# Patient Record
Sex: MALE | Race: BLACK OR AFRICAN AMERICAN | NOT HISPANIC OR LATINO | Employment: OTHER | URBAN - METROPOLITAN AREA
[De-identification: names, ages, dates, MRNs, and addresses within clinical notes are randomized per-mention and may not be internally consistent; named-entity substitution may affect disease eponyms.]

---

## 2024-05-04 ENCOUNTER — APPOINTMENT (EMERGENCY)
Dept: CT IMAGING | Facility: HOSPITAL | Age: 44
End: 2024-05-04
Payer: COMMERCIAL

## 2024-05-04 ENCOUNTER — HOSPITAL ENCOUNTER (EMERGENCY)
Facility: HOSPITAL | Age: 44
End: 2024-05-04
Attending: EMERGENCY MEDICINE
Payer: COMMERCIAL

## 2024-05-04 ENCOUNTER — APPOINTMENT (EMERGENCY)
Dept: RADIOLOGY | Facility: HOSPITAL | Age: 44
End: 2024-05-04
Payer: COMMERCIAL

## 2024-05-04 ENCOUNTER — HOSPITAL ENCOUNTER (OUTPATIENT)
Facility: HOSPITAL | Age: 44
Setting detail: OBSERVATION
Discharge: HOME | End: 2024-05-05
Attending: SURGERY | Admitting: SURGERY
Payer: COMMERCIAL

## 2024-05-04 ENCOUNTER — APPOINTMENT (OUTPATIENT)
Dept: RADIOLOGY | Facility: HOSPITAL | Age: 44
End: 2024-05-04
Payer: COMMERCIAL

## 2024-05-04 VITALS
DIASTOLIC BLOOD PRESSURE: 67 MMHG | HEART RATE: 60 BPM | TEMPERATURE: 97.8 F | RESPIRATION RATE: 22 BRPM | SYSTOLIC BLOOD PRESSURE: 125 MMHG | HEIGHT: 67 IN | OXYGEN SATURATION: 100 %

## 2024-05-04 DIAGNOSIS — B35.3 TINEA PEDIS OF BOTH FEET: ICD-10-CM

## 2024-05-04 DIAGNOSIS — S80.11XA HEMATOMA OF LEG, RIGHT, INITIAL ENCOUNTER: Primary | ICD-10-CM

## 2024-05-04 DIAGNOSIS — S81.811A LACERATION OF RIGHT LOWER EXTREMITY, INITIAL ENCOUNTER: Primary | ICD-10-CM

## 2024-05-04 DIAGNOSIS — T14.8XXA PENETRATING TRAUMA: ICD-10-CM

## 2024-05-04 LAB
ABO GROUP BLD: NORMAL
ABO GROUP BLD: NORMAL
ALBUMIN SERPL BCP-MCNC: 4.2 G/DL (ref 3.5–5)
ALBUMIN SERPL BCP-MCNC: 4.4 G/DL (ref 3.5–5)
ALP SERPL-CCNC: 74 U/L (ref 34–104)
ALP SERPL-CCNC: 74 U/L (ref 34–104)
ALT SERPL W P-5'-P-CCNC: 10 U/L (ref 7–52)
ALT SERPL W P-5'-P-CCNC: 11 U/L (ref 7–52)
ANION GAP SERPL CALCULATED.3IONS-SCNC: 4 MMOL/L (ref 4–13)
ANION GAP SERPL CALCULATED.3IONS-SCNC: 9 MMOL/L (ref 4–13)
APTT PPP: 29 SECONDS (ref 23–37)
AST SERPL W P-5'-P-CCNC: 15 U/L (ref 13–39)
AST SERPL W P-5'-P-CCNC: 17 U/L (ref 13–39)
ATRIAL RATE: 58 BPM
BASOPHILS # BLD AUTO: 0.02 THOUSANDS/ÂΜL (ref 0–0.1)
BASOPHILS # BLD AUTO: 0.03 THOUSANDS/ÂΜL (ref 0–0.1)
BASOPHILS NFR BLD AUTO: 0 % (ref 0–1)
BASOPHILS NFR BLD AUTO: 1 % (ref 0–1)
BILIRUB SERPL-MCNC: 0.74 MG/DL (ref 0.2–1)
BILIRUB SERPL-MCNC: 0.77 MG/DL (ref 0.2–1)
BLD GP AB SCN SERPL QL: NEGATIVE
BLD GP AB SCN SERPL QL: NEGATIVE
BUN SERPL-MCNC: 12 MG/DL (ref 5–25)
BUN SERPL-MCNC: 15 MG/DL (ref 5–25)
CALCIUM SERPL-MCNC: 8.7 MG/DL (ref 8.4–10.2)
CALCIUM SERPL-MCNC: 8.9 MG/DL (ref 8.4–10.2)
CHLORIDE SERPL-SCNC: 103 MMOL/L (ref 96–108)
CHLORIDE SERPL-SCNC: 107 MMOL/L (ref 96–108)
CO2 SERPL-SCNC: 24 MMOL/L (ref 21–32)
CO2 SERPL-SCNC: 29 MMOL/L (ref 21–32)
CREAT SERPL-MCNC: 0.92 MG/DL (ref 0.6–1.3)
CREAT SERPL-MCNC: 0.98 MG/DL (ref 0.6–1.3)
EOSINOPHIL # BLD AUTO: 0.04 THOUSAND/ÂΜL (ref 0–0.61)
EOSINOPHIL # BLD AUTO: 0.08 THOUSAND/ÂΜL (ref 0–0.61)
EOSINOPHIL NFR BLD AUTO: 0 % (ref 0–6)
EOSINOPHIL NFR BLD AUTO: 1 % (ref 0–6)
ERYTHROCYTE [DISTWIDTH] IN BLOOD BY AUTOMATED COUNT: 13.2 % (ref 11.6–15.1)
ERYTHROCYTE [DISTWIDTH] IN BLOOD BY AUTOMATED COUNT: 13.2 % (ref 11.6–15.1)
GFR SERPL CREATININE-BSD FRML MDRD: 92 ML/MIN/1.73SQ M
GFR SERPL CREATININE-BSD FRML MDRD: 99 ML/MIN/1.73SQ M
GLUCOSE SERPL-MCNC: 72 MG/DL (ref 65–140)
GLUCOSE SERPL-MCNC: 93 MG/DL (ref 65–140)
HCT VFR BLD AUTO: 43.3 % (ref 36.5–49.3)
HCT VFR BLD AUTO: 46 % (ref 36.5–49.3)
HGB BLD-MCNC: 14 G/DL (ref 12–17)
HGB BLD-MCNC: 14.7 G/DL (ref 12–17)
IMM GRANULOCYTES # BLD AUTO: 0.01 THOUSAND/UL (ref 0–0.2)
IMM GRANULOCYTES # BLD AUTO: 0.03 THOUSAND/UL (ref 0–0.2)
IMM GRANULOCYTES NFR BLD AUTO: 0 % (ref 0–2)
IMM GRANULOCYTES NFR BLD AUTO: 0 % (ref 0–2)
INR PPP: 1.02 (ref 0.84–1.19)
LYMPHOCYTES # BLD AUTO: 2.03 THOUSANDS/ÂΜL (ref 0.6–4.47)
LYMPHOCYTES # BLD AUTO: 2.09 THOUSANDS/ÂΜL (ref 0.6–4.47)
LYMPHOCYTES NFR BLD AUTO: 20 % (ref 14–44)
LYMPHOCYTES NFR BLD AUTO: 35 % (ref 14–44)
MCH RBC QN AUTO: 30.6 PG (ref 26.8–34.3)
MCH RBC QN AUTO: 30.8 PG (ref 26.8–34.3)
MCHC RBC AUTO-ENTMCNC: 32 G/DL (ref 31.4–37.4)
MCHC RBC AUTO-ENTMCNC: 32.3 G/DL (ref 31.4–37.4)
MCV RBC AUTO: 95 FL (ref 82–98)
MCV RBC AUTO: 96 FL (ref 82–98)
MONOCYTES # BLD AUTO: 0.58 THOUSAND/ÂΜL (ref 0.17–1.22)
MONOCYTES # BLD AUTO: 0.79 THOUSAND/ÂΜL (ref 0.17–1.22)
MONOCYTES NFR BLD AUTO: 10 % (ref 4–12)
MONOCYTES NFR BLD AUTO: 8 % (ref 4–12)
NEUTROPHILS # BLD AUTO: 3.12 THOUSANDS/ÂΜL (ref 1.85–7.62)
NEUTROPHILS # BLD AUTO: 7.15 THOUSANDS/ÂΜL (ref 1.85–7.62)
NEUTS SEG NFR BLD AUTO: 53 % (ref 43–75)
NEUTS SEG NFR BLD AUTO: 72 % (ref 43–75)
NRBC BLD AUTO-RTO: 0 /100 WBCS
NRBC BLD AUTO-RTO: 0 /100 WBCS
P AXIS: 69 DEGREES
PLATELET # BLD AUTO: 204 THOUSANDS/UL (ref 149–390)
PLATELET # BLD AUTO: 208 THOUSANDS/UL (ref 149–390)
PMV BLD AUTO: 10 FL (ref 8.9–12.7)
PMV BLD AUTO: 9.5 FL (ref 8.9–12.7)
POTASSIUM SERPL-SCNC: 3.7 MMOL/L (ref 3.5–5.3)
POTASSIUM SERPL-SCNC: 3.9 MMOL/L (ref 3.5–5.3)
PR INTERVAL: 130 MS
PROT SERPL-MCNC: 6.6 G/DL (ref 6.4–8.4)
PROT SERPL-MCNC: 6.8 G/DL (ref 6.4–8.4)
PROTHROMBIN TIME: 14 SECONDS (ref 11.6–14.5)
QRS AXIS: 53 DEGREES
QRSD INTERVAL: 72 MS
QT INTERVAL: 418 MS
QTC INTERVAL: 410 MS
RBC # BLD AUTO: 4.58 MILLION/UL (ref 3.88–5.62)
RBC # BLD AUTO: 4.77 MILLION/UL (ref 3.88–5.62)
RH BLD: POSITIVE
RH BLD: POSITIVE
SODIUM SERPL-SCNC: 136 MMOL/L (ref 135–147)
SODIUM SERPL-SCNC: 140 MMOL/L (ref 135–147)
SPECIMEN EXPIRATION DATE: NORMAL
SPECIMEN EXPIRATION DATE: NORMAL
T WAVE AXIS: 72 DEGREES
VENTRICULAR RATE: 58 BPM
WBC # BLD AUTO: 10.06 THOUSAND/UL (ref 4.31–10.16)
WBC # BLD AUTO: 5.91 THOUSAND/UL (ref 4.31–10.16)

## 2024-05-04 PROCEDURE — 96365 THER/PROPH/DIAG IV INF INIT: CPT

## 2024-05-04 PROCEDURE — EDAIR PR ED AIR: Performed by: EMERGENCY MEDICINE

## 2024-05-04 PROCEDURE — 82330 ASSAY OF CALCIUM: CPT

## 2024-05-04 PROCEDURE — 84132 ASSAY OF SERUM POTASSIUM: CPT

## 2024-05-04 PROCEDURE — 82803 BLOOD GASES ANY COMBINATION: CPT

## 2024-05-04 PROCEDURE — 85025 COMPLETE CBC W/AUTO DIFF WBC: CPT | Performed by: SURGERY

## 2024-05-04 PROCEDURE — 99223 1ST HOSP IP/OBS HIGH 75: CPT | Performed by: SURGERY

## 2024-05-04 PROCEDURE — 71045 X-RAY EXAM CHEST 1 VIEW: CPT

## 2024-05-04 PROCEDURE — 93010 ELECTROCARDIOGRAM REPORT: CPT | Performed by: INTERNAL MEDICINE

## 2024-05-04 PROCEDURE — 86901 BLOOD TYPING SEROLOGIC RH(D): CPT | Performed by: SURGERY

## 2024-05-04 PROCEDURE — 73706 CT ANGIO LWR EXTR W/O&W/DYE: CPT

## 2024-05-04 PROCEDURE — 96375 TX/PRO/DX INJ NEW DRUG ADDON: CPT

## 2024-05-04 PROCEDURE — 73551 X-RAY EXAM OF FEMUR 1: CPT

## 2024-05-04 PROCEDURE — 85014 HEMATOCRIT: CPT

## 2024-05-04 PROCEDURE — 99284 EMERGENCY DEPT VISIT MOD MDM: CPT

## 2024-05-04 PROCEDURE — 85610 PROTHROMBIN TIME: CPT | Performed by: EMERGENCY MEDICINE

## 2024-05-04 PROCEDURE — 86901 BLOOD TYPING SEROLOGIC RH(D): CPT | Performed by: EMERGENCY MEDICINE

## 2024-05-04 PROCEDURE — 86850 RBC ANTIBODY SCREEN: CPT | Performed by: EMERGENCY MEDICINE

## 2024-05-04 PROCEDURE — 85025 COMPLETE CBC W/AUTO DIFF WBC: CPT | Performed by: EMERGENCY MEDICINE

## 2024-05-04 PROCEDURE — 93005 ELECTROCARDIOGRAM TRACING: CPT

## 2024-05-04 PROCEDURE — 85730 THROMBOPLASTIN TIME PARTIAL: CPT | Performed by: EMERGENCY MEDICINE

## 2024-05-04 PROCEDURE — 90715 TDAP VACCINE 7 YRS/> IM: CPT | Performed by: EMERGENCY MEDICINE

## 2024-05-04 PROCEDURE — 96376 TX/PRO/DX INJ SAME DRUG ADON: CPT

## 2024-05-04 PROCEDURE — 86900 BLOOD TYPING SEROLOGIC ABO: CPT | Performed by: SURGERY

## 2024-05-04 PROCEDURE — 86850 RBC ANTIBODY SCREEN: CPT | Performed by: SURGERY

## 2024-05-04 PROCEDURE — 86900 BLOOD TYPING SEROLOGIC ABO: CPT | Performed by: EMERGENCY MEDICINE

## 2024-05-04 PROCEDURE — 80053 COMPREHEN METABOLIC PANEL: CPT | Performed by: SURGERY

## 2024-05-04 PROCEDURE — 82947 ASSAY GLUCOSE BLOOD QUANT: CPT

## 2024-05-04 PROCEDURE — 36415 COLL VENOUS BLD VENIPUNCTURE: CPT | Performed by: EMERGENCY MEDICINE

## 2024-05-04 PROCEDURE — 84295 ASSAY OF SERUM SODIUM: CPT

## 2024-05-04 PROCEDURE — 80053 COMPREHEN METABOLIC PANEL: CPT | Performed by: EMERGENCY MEDICINE

## 2024-05-04 RX ORDER — CLINDAMYCIN HYDROCHLORIDE 150 MG/1
450 CAPSULE ORAL EVERY 6 HOURS
Qty: 120 CAPSULE | Refills: 0 | Status: SHIPPED | OUTPATIENT
Start: 2024-05-04 | End: 2024-05-05

## 2024-05-04 RX ORDER — LIDOCAINE HCL/EPINEPHRINE/PF 2%-1:200K
1 VIAL (ML) INJECTION ONCE
Status: COMPLETED | OUTPATIENT
Start: 2024-05-04 | End: 2024-05-04

## 2024-05-04 RX ORDER — ENOXAPARIN SODIUM 100 MG/ML
30 INJECTION SUBCUTANEOUS EVERY 12 HOURS
Status: DISCONTINUED | OUTPATIENT
Start: 2024-05-05 | End: 2024-05-05 | Stop reason: HOSPADM

## 2024-05-04 RX ORDER — ACETAMINOPHEN 325 MG/1
975 TABLET ORAL EVERY 6 HOURS SCHEDULED
Status: DISCONTINUED | OUTPATIENT
Start: 2024-05-05 | End: 2024-05-05 | Stop reason: HOSPADM

## 2024-05-04 RX ORDER — CLINDAMYCIN PHOSPHATE 600 MG/50ML
600 INJECTION, SOLUTION INTRAVENOUS ONCE
Qty: 50 ML | Refills: 0 | Status: COMPLETED | OUTPATIENT
Start: 2024-05-04 | End: 2024-05-04

## 2024-05-04 RX ORDER — FENTANYL CITRATE 50 UG/ML
100 INJECTION, SOLUTION INTRAMUSCULAR; INTRAVENOUS ONCE
Status: DISCONTINUED | OUTPATIENT
Start: 2024-05-04 | End: 2024-05-04 | Stop reason: HOSPADM

## 2024-05-04 RX ORDER — OXYCODONE HYDROCHLORIDE AND ACETAMINOPHEN 5; 325 MG/1; MG/1
1 TABLET ORAL EVERY 4 HOURS PRN
Qty: 15 TABLET | Refills: 0 | Status: SHIPPED | OUTPATIENT
Start: 2024-05-04 | End: 2024-05-05

## 2024-05-04 RX ORDER — MORPHINE SULFATE 10 MG/ML
6 INJECTION, SOLUTION INTRAMUSCULAR; INTRAVENOUS ONCE
Status: COMPLETED | OUTPATIENT
Start: 2024-05-04 | End: 2024-05-04

## 2024-05-04 RX ORDER — LIDOCAINE HCL/EPINEPHRINE/PF 2%-1:200K
VIAL (ML) INJECTION
Status: COMPLETED
Start: 2024-05-04 | End: 2024-05-04

## 2024-05-04 RX ORDER — OXYCODONE HYDROCHLORIDE 5 MG/1
5 TABLET ORAL EVERY 4 HOURS PRN
Status: DISCONTINUED | OUTPATIENT
Start: 2024-05-04 | End: 2024-05-05 | Stop reason: HOSPADM

## 2024-05-04 RX ADMIN — CLINDAMYCIN PHOSPHATE 600 MG: 600 INJECTION, SOLUTION INTRAVENOUS at 19:38

## 2024-05-04 RX ADMIN — MORPHINE SULFATE 6 MG: 10 INJECTION INTRAVENOUS at 17:30

## 2024-05-04 RX ADMIN — Medication 1 ML: at 17:36

## 2024-05-04 RX ADMIN — TETANUS TOXOID, REDUCED DIPHTHERIA TOXOID AND ACELLULAR PERTUSSIS VACCINE, ADSORBED 0.5 ML: 5; 2.5; 8; 8; 2.5 SUSPENSION INTRAMUSCULAR at 18:48

## 2024-05-04 RX ADMIN — IOHEXOL 100 ML: 350 INJECTION, SOLUTION INTRAVENOUS at 18:26

## 2024-05-04 RX ADMIN — MORPHINE SULFATE 6 MG: 10 INJECTION INTRAVENOUS at 21:27

## 2024-05-04 RX ADMIN — LIDOCAINE HYDROCHLORIDE,EPINEPHRINE BITARTRATE 1 ML: 20; .005 INJECTION, SOLUTION EPIDURAL; INFILTRATION; INTRACAUDAL; PERINEURAL at 17:36

## 2024-05-04 RX ADMIN — IOHEXOL 100 ML: 350 INJECTION, SOLUTION INTRAVENOUS at 20:41

## 2024-05-04 NOTE — ED PROVIDER NOTES
History  Chief Complaint   Patient presents with   • Extremity Laceration     Pt states he fell and landed on a sharp object, pt has laceration to right thigh      44-year-old male presents with an injury to right thigh.  Patient states he tripped and fell and presents with a 2 cm laceration with active bleeding, arterial.  Pursestrings are placed and bleeding is able to be controlled with stitches and pressure.  CAT scan is performed to evaluate for active bleeding.  No other injuries occurred.  Neurovascular intact distal to the injury.    Trauma eval    Airway Intact  Breath sounds equal B/L  Circulation patent, 2+ pulses in all extremities  No deformity, no disability, GCS = 15  Exposure completed - no further injury discovered            None       History reviewed. No pertinent past medical history.    History reviewed. No pertinent surgical history.    History reviewed. No pertinent family history.  I have reviewed and agree with the history as documented.    E-Cigarette/Vaping     E-Cigarette/Vaping Substances     Social History     Tobacco Use   • Smoking status: Never   • Smokeless tobacco: Never       Review of Systems   Constitutional:  Negative for chills and fever.   Respiratory:  Negative for chest tightness and shortness of breath.    Cardiovascular:  Negative for chest pain and leg swelling.   Gastrointestinal:  Negative for abdominal pain, nausea and vomiting.   Musculoskeletal:  Negative for back pain and neck pain.        Right leg pain   Skin:  Positive for wound (R leg).   Neurological:  Negative for dizziness, weakness, numbness and headaches.   Hematological:  Does not bruise/bleed easily.       Physical Exam  Physical Exam  Constitutional:       General: He is not in acute distress.     Appearance: He is well-developed. He is not diaphoretic.   HENT:      Head: Normocephalic and atraumatic.      Right Ear: External ear normal.      Left Ear: External ear normal.   Eyes:      General:          Right eye: No discharge.         Left eye: No discharge.      Conjunctiva/sclera: Conjunctivae normal.      Pupils: Pupils are equal, round, and reactive to light.   Neck:      Trachea: No tracheal deviation.      Comments: No midline tenderness, no step offs  Cardiovascular:      Rate and Rhythm: Normal rate and regular rhythm.      Heart sounds: Normal heart sounds.   Pulmonary:      Effort: Pulmonary effort is normal.      Breath sounds: Normal breath sounds. No stridor. No wheezing.   Chest:      Chest wall: No tenderness.   Abdominal:      General: There is no distension.      Palpations: Abdomen is soft.      Tenderness: There is no abdominal tenderness. There is no guarding.      Comments: Stable pelvis   Musculoskeletal:         General: Swelling and tenderness present. No deformity. Normal range of motion.      Cervical back: Neck supple.      Comments: Back is non-tender, no step offs   Skin:     General: Skin is warm and dry.      Comments: No abrasions, no contusions.  2 cm laceration with active bleeding to right thigh.  There is arterial bleeding.  This was controlled with pursestring sutures as well as pressure.  Neurovascular intact distal to the injury.   Neurological:      Mental Status: He is alert and oriented to person, place, and time.      Cranial Nerves: No cranial nerve deficit.      Sensory: No sensory deficit.      Comments: GCS = 15   Psychiatric:         Behavior: Behavior normal.       Vital Signs  ED Triage Vitals   Temperature Pulse Respirations Blood Pressure SpO2   05/04/24 1726 05/04/24 1726 05/04/24 1726 05/04/24 1726 05/04/24 1726   97.8 °F (36.6 °C) 64 16 141/64 99 %      Temp Source Heart Rate Source Patient Position - Orthostatic VS BP Location FiO2 (%)   05/04/24 1726 05/04/24 1726 05/04/24 1726 05/04/24 1726 --   Temporal Monitor Lying Right arm       Pain Score       05/04/24 1730       10 - Worst Possible Pain           Vitals:    05/04/24 1726 05/04/24 1730 05/04/24 1800  05/04/24 1845   BP: 141/64 125/70 135/77 120/55   Pulse: 64 57 (!) 52 (!) 49   Patient Position - Orthostatic VS: Lying Lying Lying Lying         Visual Acuity      ED Medications  Medications   morphine injection 6 mg (6 mg Intravenous Given 5/4/24 1730)   lidocaine-epinephrine (XYLOCAINE-MPF/EPINEPHRINE) 2 %-1:200,000 injection 1 mL (1 mL Infiltration Given 5/4/24 1736)   tetanus-diphtheria-acellular pertussis (BOOSTRIX) IM injection 0.5 mL (0.5 mL Intramuscular Given 5/4/24 1848)   iohexol (OMNIPAQUE) 350 MG/ML injection (MULTI-DOSE) 100 mL (100 mL Intravenous Given 5/4/24 1826)       Diagnostic Studies  Results Reviewed       Procedure Component Value Units Date/Time    Comprehensive metabolic panel [[id number]] Collected: 05/04/24 1734    Lab Status: Final result Specimen: Blood from Arm, Right Updated: 05/04/24 1759     Sodium 140 mmol/L      Potassium 3.9 mmol/L      Chloride 107 mmol/L      CO2 29 mmol/L      ANION GAP 4 mmol/L      BUN 15 mg/dL      Creatinine 0.98 mg/dL      Glucose 72 mg/dL      Calcium 8.9 mg/dL      AST 17 U/L      ALT 11 U/L      Alkaline Phosphatase 74 U/L      Total Protein 6.6 g/dL      Albumin 4.2 g/dL      Total Bilirubin 0.77 mg/dL      eGFR 92 ml/min/1.73sq m     Narrative:      National Kidney Disease Foundation guidelines for Chronic Kidney Disease (CKD):   •  Stage 1 with normal or high GFR (GFR > 90 mL/min/1.73 square meters)  •  Stage 2 Mild CKD (GFR = 60-89 mL/min/1.73 square meters)  •  Stage 3A Moderate CKD (GFR = 45-59 mL/min/1.73 square meters)  •  Stage 3B Moderate CKD (GFR = 30-44 mL/min/1.73 square meters)  •  Stage 4 Severe CKD (GFR = 15-29 mL/min/1.73 square meters)  •  Stage 5 End Stage CKD (GFR <15 mL/min/1.73 square meters)  Note: GFR calculation is accurate only with a steady state creatinine    Protime-INR [[id number]]  (Normal) Collected: 05/04/24 1734    Lab Status: Final result Specimen: Blood from Arm, Right Updated: 05/04/24 3779     Protime 14.0 seconds       INR 1.02    APTT [[id number]]  (Normal) Collected: 05/04/24 1734    Lab Status: Final result Specimen: Blood from Arm, Right Updated: 05/04/24 1752     PTT 29 seconds     CBC and differential [[id number]] Collected: 05/04/24 1734    Lab Status: Final result Specimen: Blood from Arm, Right Updated: 05/04/24 1739     WBC 5.91 Thousand/uL      RBC 4.58 Million/uL      Hemoglobin 14.0 g/dL      Hematocrit 43.3 %      MCV 95 fL      MCH 30.6 pg      MCHC 32.3 g/dL      RDW 13.2 %      MPV 9.5 fL      Platelets 208 Thousands/uL      nRBC 0 /100 WBCs      Segmented % 53 %      Immature Grans % 0 %      Lymphocytes % 35 %      Monocytes % 10 %      Eosinophils Relative 1 %      Basophils Relative 1 %      Absolute Neutrophils 3.12 Thousands/µL      Absolute Immature Grans 0.01 Thousand/uL      Absolute Lymphocytes 2.09 Thousands/µL      Absolute Monocytes 0.58 Thousand/µL      Eosinophils Absolute 0.08 Thousand/µL      Basophils Absolute 0.03 Thousands/µL                    XR chest 1 view portable   ED Interpretation by Gudelia Bello DO (05/04 1821)   No pneumothorax.  No acute sign of traumatic injury.      Final Result by Gorge Dowell DO (05/04 1824)      No acute cardiopulmonary disease.            Workstation performed: [id number]         XR femur 1 vw right   ED Interpretation by Gudelia Bello DO (05/04 1819)   No acute osseous injury or embedded FB      Final Result by Gorge Dowell DO (05/04 1827)      LIMITED STUDY. No acute osseous abnormality within limitations.      Curvilinear density seen along the medial soft tissues mid thigh on only 1 view, etiology unclear.      Repeat 4 view right femoral series may be considered.      Workstation performed: [id number]         CT VENOGRAM LOWER EXTREMITIES RIGHT w/ contrast    (Results Pending)              Procedures  Universal Protocol:  Consent: Verbal consent obtained.  Risks and benefits: risks, benefits and alternatives were  discussed  Consent given by: patient  Patient identity confirmed: verbally with patient  Laceration repair    Date/Time: 5/4/2024 7:21 PM    Performed by: Gudelia Bello DO  Authorized by: Gudelia Bello DO  Foreign bodies: no foreign bodies  Tendon involvement: none  Nerve involvement: none  Vascular damage: no    Sedation:  Patient sedated: no      Wound Dehiscence:  Superficial Wound Dehiscence: simple closure      Procedure Details:  Irrigation solution: saline  Irrigation method: syringe  Amount of cleaning: standard  Debridement: none  Degree of undermining: none  Skin closure: 3-0 nylon (2 pursestring sutures.  3 simple interrupted sutures.)  Approximation: close  Approximation difficulty: simple  Patient tolerance: patient tolerated the procedure well with no immediate complications  Comments: 2 cm laceration to right thigh.  Pressure dressing applied over sutures and bleeding is controlled.             ED Course  ED Course as of 05/04/24 1923   Sat May 04, 2024   1819 Hemoglobin: 14.0   1819 POCT INR: 1.02   1918 Patient believes that he has a Q-tip stuck in his ear.  Both ears evaluated, no q-tips are seen                               SBIRT 22yo+      Flowsheet Row Most Recent Value   Initial Alcohol Screen: US AUDIT-C     1. How often do you have a drink containing alcohol? 0 Filed at: 05/04/2024 1726   2. How many drinks containing alcohol do you have on a typical day you are drinking?  0 Filed at: 05/04/2024 1726   3a. Male UNDER 65: How often do you have five or more drinks on one occasion? 0 Filed at: 05/04/2024 1726   Audit-C Score 0 Filed at: 05/04/2024 1726   LORIE: How many times in the past year have you...    Used an illegal drug or used a prescription medication for non-medical reasons? Never Filed at: 05/04/2024 1726                      Medical Decision Making  Laceration to right thigh.  Sutures are able to stop the bleeding.   CT scan performed to r/o active  extrav.  Tetanus updated.     Amount and/or Complexity of Data Reviewed  Labs:  Decision-making details documented in ED Course.  Radiology: ordered and independent interpretation performed.    Risk  Prescription drug management.           Disposition  Final diagnoses:   Laceration of right lower extremity, initial encounter     Time reflects when diagnosis was documented in both MDM as applicable and the Disposition within this note       Time User Action Codes Description Comment    5/4/2024  7:23 PM Gudelia Bello Add [S81.811A] Laceration of right lower extremity, initial encounter           ED Disposition       None          Follow-up Information    None         Patient's Medications    No medications on file       No discharge procedures on file.    PDMP Review       None            ED Provider  Electronically Signed by

## 2024-05-05 VITALS
OXYGEN SATURATION: 98 % | RESPIRATION RATE: 16 BRPM | HEIGHT: 67 IN | DIASTOLIC BLOOD PRESSURE: 58 MMHG | SYSTOLIC BLOOD PRESSURE: 116 MMHG | BODY MASS INDEX: 24.33 KG/M2 | HEART RATE: 74 BPM | TEMPERATURE: 98.5 F | WEIGHT: 155 LBS

## 2024-05-05 PROBLEM — S80.11XA HEMATOMA OF LEG, RIGHT, INITIAL ENCOUNTER: Status: ACTIVE | Noted: 2024-05-05

## 2024-05-05 PROBLEM — S81.811A LEG LACERATION, RIGHT, INITIAL ENCOUNTER: Status: ACTIVE | Noted: 2024-05-05

## 2024-05-05 LAB
ERYTHROCYTE [DISTWIDTH] IN BLOOD BY AUTOMATED COUNT: 13.2 % (ref 11.6–15.1)
HCT VFR BLD AUTO: 39.3 % (ref 36.5–49.3)
HGB BLD-MCNC: 12.5 G/DL (ref 12–17)
MCH RBC QN AUTO: 30.6 PG (ref 26.8–34.3)
MCHC RBC AUTO-ENTMCNC: 31.8 G/DL (ref 31.4–37.4)
MCV RBC AUTO: 96 FL (ref 82–98)
PLATELET # BLD AUTO: 175 THOUSANDS/UL (ref 149–390)
PMV BLD AUTO: 10 FL (ref 8.9–12.7)
RBC # BLD AUTO: 4.09 MILLION/UL (ref 3.88–5.62)
WBC # BLD AUTO: 8.37 THOUSAND/UL (ref 4.31–10.16)

## 2024-05-05 PROCEDURE — 85027 COMPLETE CBC AUTOMATED: CPT | Performed by: SURGERY

## 2024-05-05 PROCEDURE — 99238 HOSP IP/OBS DSCHRG MGMT 30/<: CPT | Performed by: SURGERY

## 2024-05-05 RX ORDER — ACETAMINOPHEN 325 MG/1
975 TABLET ORAL EVERY 6 HOURS SCHEDULED
Start: 2024-05-05

## 2024-05-05 RX ORDER — CLOTRIMAZOLE 1 %
CREAM (GRAM) TOPICAL 2 TIMES DAILY
Qty: 40 G | Refills: 0 | Status: SHIPPED | OUTPATIENT
Start: 2024-05-05 | End: 2024-05-12

## 2024-05-05 RX ORDER — CLINDAMYCIN HYDROCHLORIDE 300 MG/1
300 CAPSULE ORAL 3 TIMES DAILY
Qty: 15 CAPSULE | Refills: 0 | Status: SHIPPED | OUTPATIENT
Start: 2024-05-05 | End: 2024-05-10

## 2024-05-05 RX ADMIN — ACETAMINOPHEN 975 MG: 325 TABLET, FILM COATED ORAL at 00:41

## 2024-05-05 RX ADMIN — OXYCODONE HYDROCHLORIDE 5 MG: 5 TABLET ORAL at 09:50

## 2024-05-05 RX ADMIN — ACETAMINOPHEN 975 MG: 325 TABLET, FILM COATED ORAL at 06:12

## 2024-05-05 NOTE — UTILIZATION REVIEW
Initial Clinical Review    Admission: Date/Time/Statement:   Admission Orders (From admission, onward)       Ordered        05/04/24 2253  Place in Observation  Once                          Orders Placed This Encounter   Procedures    Place in Observation     Standing Status:   Standing     Number of Occurrences:   1     Order Specific Question:   Level of Care     Answer:   Med Surg [16]     ED Arrival Information       Expected   5/4/2024 20:31    Arrival   5/4/2024 22:30    Acuity   Emergent              Means of arrival   Ambulance    Escorted by   [facility]    Service   Trauma    Admission type   Emergency              Arrival complaint   Penetrating trauma R thigh             Chief Complaint   Patient presents with    Trauma     TT from [facility]. See trauma narrator       Initial Presentation: 44 y.o. male who initially presented at [facility] after falling in his lawn landing on an object stuck in the ground. He was cleaned and sutured at [facility] and CTA was performed revealing no evidence of acute arterial injury. No contrast extravasation. + intramuscular hematoma of rectus femoris. He was transferred to [facility] as a a trauma level A. On examination, the right thigh wound was sutured with underlying hematoma. No skin changes. AROM of all extremities, palpable DP/PT bilaterally. Pain controlled. No additional injuries noted.     Anticipated Length of Stay/Certification Statement:      ADMIT OBSERVATION STATUS    ED Triage Vitals   Temperature Pulse Respirations Blood Pressure SpO2   05/05/24 0405 05/04/24 2233 05/04/24 2233 05/04/24 2233 05/04/24 2233   98.3 °F (36.8 °C) 66 18 144/77 97 %      Temp src Heart Rate Source Patient Position - Orthostatic VS BP Location FiO2 (%)   -- -- 05/05/24 0316 05/05/24 0316 --     Lying Right arm       Pain Score       05/04/24 2236       9          Wt Readings from Last 1 Encounters:   05/05/24 70.3 kg (155 lb)     Additional Vital Signs:         Pertinent  "Labs/Diagnostic Test Results:   No orders to display         Results from last 7 days   Lab Units 05/05/24  0637 05/04/24 2246 05/04/24  1734   WBC Thousand/uL 8.37 10.06 5.91   HEMOGLOBIN g/dL 12.5 14.7 14.0   HEMATOCRIT % 39.3 46.0 43.3   PLATELETS Thousands/uL 175 204 208   TOTAL NEUT ABS Thousands/µL  --  7.15 3.12         Results from last 7 days   Lab Units 05/04/24 2246 05/04/24  1734   SODIUM mmol/L 136 140   POTASSIUM mmol/L 3.7 3.9   CHLORIDE mmol/L 103 107   CO2 mmol/L 24 29   ANION GAP mmol/L 9 4   BUN mg/dL 12 15   CREATININE mg/dL 0.92 0.98   EGFR ml/min/1.73sq m 99 92   CALCIUM mg/dL 8.7 8.9     Results from last 7 days   Lab Units 05/04/24 2246 05/04/24  1734   AST U/L 15 17   ALT U/L 10 11   ALK PHOS U/L 74 74   TOTAL PROTEIN g/dL 6.8 6.6   ALBUMIN g/dL 4.4 4.2   TOTAL BILIRUBIN mg/dL 0.74 0.77         Results from last 7 days   Lab Units 05/04/24 2246 05/04/24  1734   GLUCOSE RANDOM mg/dL 93 72             No results found for: \"BETA-HYDROXYBUTYRATE\"                           Results from last 7 days   Lab Units 05/04/24  1734   PROTIME seconds 14.0   INR  1.02   PTT seconds 29                                                                                                               ED Treatment:   Medication Administration from 05/04/2024 2031 to 05/05/2024 0356         Date/Time Order Dose Route Action Action by Comments     05/05/2024 0041 EDT acetaminophen (TYLENOL) tablet 975 mg 975 mg Oral Given Sandra Gross RN --          History reviewed. No pertinent past medical history.  Present on Admission:  **None**      Admitting Diagnosis: Laceration of right lower extremity, initial encounter [S81.213D]  Age/Sex: 44 y.o. male  Admission Orders:  Scheduled Medications:  acetaminophen, 975 mg, Oral, Q6H AMERICA  enoxaparin, 30 mg, Subcutaneous, Q12H      Continuous IV Infusions:     PRN Meds:  oxyCODONE, 5 mg, Oral, Q4H PRN  oxyCODONE, 2.5 mg, Oral, Q4H PRN        None    Network Utilization " Review Department  ATTENTION: Please call with any questions or concerns to [phone] and carefully listen to the prompts so that you are directed to the right person. All voicemails are confidential.   For Discharge needs, contact Care Management DC Support Team at [phone] opt. 2  Send all requests for admission clinical reviews, approved or denied determinations and any other requests to dedicated fax number below belonging to the campus where the patient is receiving treatment. List of dedicated fax numbers for the Facilities:  FACILITY NAME UR FAX NUMBER   ADMISSION DENIALS (Administrative/Medical Necessity) [phone]   DISCHARGE SUPPORT TEAM (NETWORK) [phone]   PARENT CHILD HEALTH (Maternity/NICU/Pediatrics) [phone]   [facility] [phone]   [facility] [phone]   [facility] [phone]   [facility] [phone]   Critical access hospital [phone]   [facility] [phone]   General acute hospital [phone]   [facility] [phone]   [facility] [phone]   [facility] [phone]   [facility] [phone]   [facility] [phone]

## 2024-05-05 NOTE — ED PROVIDER NOTES
I received s/o from Dr Bello.   Penetrating injury to R thigh. Sutured by initial provider. Pending CT-V results at time of s/o.   Upon examining patient to evaluate for presence of expanding hematoma the R anterior thigh wound started to briskly bleed bright red blood, approximately 500cc blood drained through sutures. This was controlled with direct pressure and a pressure dressing was reapplied. Pt sent for stat CTA to evaluate for presence of arterial injury and transferred to B for trauma evaluation given penetrating injury.     Prieto Hook MD  05/04/24 7866

## 2024-05-05 NOTE — ED NOTES
SLMO to [facility] ED Level A to [place]  Accepting Dr Kaur p/u 9:00pm SLETsCall report to [phone]     Renay Moreno RN  05/04/24 2049

## 2024-05-05 NOTE — ED NOTES
Plan of care updated. Patient aware waiting admission bed. Patient friend at bedside.      Sandra Gross RN  05/04/24 3273

## 2024-05-05 NOTE — NURSING NOTE
It was explained to the patient why there is bloodwork ordered.  He said he is a hard stick, has small veins, and to come back later.  I told him to let me know when he is ready for labs to be drawn.

## 2024-05-05 NOTE — ASSESSMENT & PLAN NOTE
- Right anterior thigh laceration, 3 cm, repaired with sutures at outside facility  -Associated hematoma beneath  -Local wound care with soap and water daily  -Clindamycin for 5 days to avoid infection, high risk  -Tdap updated  -Go to [facility] emergency department where sutures were placed due to distance from [facility] trauma [facility]

## 2024-05-05 NOTE — ED PROVIDER NOTES
Emergency Department Airway Evaluation and Management Form    History  Obtained from: EMS  [place]  No chief complaint on file.    44-year-old male status post penetrating trauma to the right thigh.  Significant blood loss reported by outside hospital.  Level a trauma activation.          No past medical history on file.  No past surgical history on file.  No family history on file.  Social History     Tobacco Use    Smoking status: Never    Smokeless tobacco: Never     I have reviewed and agree with the history as documented.    Review of Systems    Physical Exam  /77   Pulse 66   Resp 18   SpO2 97%     Physical Exam  HENT:      Mouth/Throat:      Comments: Airway open and patent.        ED Medications  Medications - No data to display    Intubation  Procedures    Notes  No acute airway intervention indicated.    Final Diagnosis  Final diagnoses:   None       ED Provider  Electronically Signed by     Huan Wellington MD  05/04/24 1408

## 2024-05-05 NOTE — DISCHARGE INSTR - AVS FIRST PAGE
Traumatic Leg Laceration and Wound Care Instructions:     Wound Care:  - Wash laceration/wound daily, gently in the shower, do not scrub. Pat dry with clean towel. Do NOT immerse completely in water (i.e. tub or swimming pool) until stitches are removed  - Follow-up with the Trauma Service (or return to [facility] Emergency Department) as directed for suture removal.  - Call office if you develop fever/chills, redness/swelling/drainage from the site.    Additional Instructions:  - Complete 5 days of antibiotics after discharge  - Apply ice to the area to help with swelling  - Keep the wound covered and compressed  - The hematoma will travel down your leg with gravity  - If you have any questions or concerns after discharge please call the trauma office.  - Call office or return to ER if fever greater than 101, chills, increasing redness/swelling at site of laceration/wound, purulent or foul smelling drainage from laceration/wound, and/or worsening/uncontrollable pain.

## 2024-05-05 NOTE — PLAN OF CARE
Problem: METABOLIC, FLUID AND ELECTROLYTES - ADULT  Goal: Fluid balance maintained  Description: INTERVENTIONS:  - Monitor labs   - Monitor I/O and WT  - Instruct patient on fluid and nutrition as appropriate  - Assess for signs & symptoms of volume excess or deficit  Outcome: Progressing     Problem: SKIN/TISSUE INTEGRITY - ADULT  Goal: Incision(s), wounds(s) or drain site(s) healing without S/S of infection  Description: INTERVENTIONS  - Assess and document dressing, incision, wound bed, drain sites and surrounding tissue  - Provide patient and family education  - Perform skin care/dressing changes every day  Outcome: Progressing     Problem: HEMATOLOGIC - ADULT  Goal: Maintains hematologic stability  Description: INTERVENTIONS  - Assess for signs and symptoms of bleeding or hemorrhage  - Monitor labs  - Administer supportive blood products/factors as ordered and appropriate  Outcome: Progressing     Problem: MUSCULOSKELETAL - ADULT  Goal: Maintain or return mobility to safest level of function  Description: INTERVENTIONS:  - Assess patient's ability to carry out ADLs; assess patient's baseline for ADL function and identify physical deficits which impact ability to perform ADLs (bathing, care of mouth/teeth, toileting, grooming, dressing, etc.)  - Assess/evaluate cause of self-care deficits   - Assess range of motion  - Assess patient's mobility  - Assess patient's need for assistive devices and provide as appropriate  - Encourage maximum independence but intervene and supervise when necessary  - Involve family in performance of ADLs  - Assess for home care needs following discharge   - Consider OT consult to assist with ADL evaluation and planning for discharge  - Provide patient education as appropriate  Outcome: Progressing  Goal: Maintain proper alignment of affected body part  Description: INTERVENTIONS:  - Support, maintain and protect limb and body alignment  - Provide patient/ family with appropriate  education  Outcome: Progressing

## 2024-05-05 NOTE — EMTALA/ACUTE CARE TRANSFER
[facility] EMERGENCY DEPARTMENT  [address]  [facility] [address]  Dept: [phone]      EMTALA TRANSFER CONSENT    NAME Russ TELLEZ 1980                              MRN [id number]    I have been informed of my rights regarding examination, treatment, and transfer   by Dr. Prieto Hook MD    Benefits: Specialized equipment and/or services available at the receiving facility (Include comment)________________________    Risks: Potential for delay in receiving treatment, Potential deterioration of medical condition, Loss of IV, Increased discomfort during transfer, Possible worsening of condition or death during transfer      Consent for Transfer:  I acknowledge that my medical condition has been evaluated and explained to me by the emergency department physician or other qualified medical person and/or my attending physician, who has recommended that I be transferred to the service of  Accepting Physician: Dr Kaur at Accepting Facility Name, City & State : [facility]. The above potential benefits of such transfer, the potential risks associated with such transfer, and the probable risks of not being transferred have been explained to me, and I fully understand them.  The doctor has explained that, in my case, the benefits of transfer outweigh the risks.  I agree to be transferred.    I authorize the performance of emergency medical procedures and treatments upon me in both transit and upon arrival at the receiving facility.  Additionally, I authorize the release of any and all medical records to the receiving facility and request they be transported with me, if possible.  I understand that the safest mode of transportation during a medical emergency is an ambulance and that the Hospital advocates the use of this mode of transport. Risks of traveling to the receiving facility by car, including absence of medical control, life sustaining  equipment, such as oxygen, and medical personnel has been explained to me and I fully understand them.    (ARMANDO CORRECT BOX BELOW)  [  ]  I consent to the stated transfer and to be transported by ambulance/helicopter.  [  ]  I consent to the stated transfer, but refuse transportation by ambulance and accept full responsibility for my transportation by car.  I understand the risks of non-ambulance transfers and I exonerate the Hospital and its staff from any deterioration in my condition that results from this refusal.    X___________________________________________    DATE  24  TIME________  Signature of patient or legally responsible individual signing on patient behalf           RELATIONSHIP TO PATIENT_________________________          Provider Certification    NAME Russ Diaz                                         1980                              MRN [id number]    A medical screening exam was performed on the above named patient.  Based on the examination:    Condition Necessitating Transfer The primary encounter diagnosis was Laceration of right lower extremity, initial encounter. A diagnosis of Penetrating trauma was also pertinent to this visit.    Patient Condition: An emergency transfer is being made prior to stabilization due to the need for definitive care and the benefit of transfer outweighs the risk    Reason for Transfer: Level of Care needed not available at this facility    Transfer Requirements: Facility B   Space available and qualified personnel available for treatment as acknowledged by    Agreed to accept transfer and to provide appropriate medical treatment as acknowledged by       Dr Kaur  Appropriate medical records of the examination and treatment of the patient are provided at the time of transfer   STAFF INITIAL WHEN COMPLETED _______  Transfer will be performed by qualified personnel from    and appropriate transfer equipment as required, including the use of  necessary and appropriate life support measures.    Provider Certification: I have examined the patient and explained the following risks and benefits of being transferred/refusing transfer to the patient/family:  General risk, such as traffic hazards, adverse weather conditions, rough terrain or turbulence, possible failure of equipment (including vehicle or aircraft), or consequences of actions of persons outside the control of the transport personnel, Unanticipated needs of medical equipment and personnel during transport, Risk of worsening condition, The possibility of a transport vehicle being unavailable      Based on these reasonable risks and benefits to the patient and/or the unborn child(yvette), and based upon the information available at the time of the patient’s examination, I certify that the medical benefits reasonably to be expected from the provision of appropriate medical treatments at another medical facility outweigh the increasing risks, if any, to the individual’s medical condition, and in the case of labor to the unborn child, from effecting the transfer.    X____________________________________________ DATE 05/04/24        TIME_______      ORIGINAL - SEND TO MEDICAL RECORDS   COPY - SEND WITH PATIENT DURING TRANSFER

## 2024-05-05 NOTE — PROGRESS NOTES
Pastoral Care Progress Note    2024  Patient: Russ Diaz : 1980  Admission Date & Time: 2024 1729  MRN: [id number] CSN: [id number]         called for incoming trauma and found pt in bay and friend in family room.  supported friend until she was allowed in pt's room.  led friend to pt's room, provided coffee and prayed. No further support was necessary and pt was released by morning.                24 0700   Clinical Encounter Type   Visited With Family;Patient

## 2024-05-05 NOTE — ASSESSMENT & PLAN NOTE
- Small hematoma R thigh associated with penetrating injury  - Golf ball sized hematoma, minimally tender  -Hemoglobin and vitals stable  -Neurovascularly intact  -Ambulating independently  -Pain tolerated  -DVT prophylaxis while inpatient  -Continue compressive dressing, elevate extremity, apply ice  -Discharge home  -Follow-up with primary care provider due to distance from trauma center   Family

## 2024-05-05 NOTE — H&P
H&P - Trauma   Russ Diaz 44 y.o. male MRN: [id number]  Unit/Bed#: ED 06 Encounter: [id number]    Trauma Alert: Level A   Model of Arrival: Ambulance    Trauma Team: Attending Dr. Kaur, Residents Dr. Mitchell, and Fellow Dr. Wagner  Consultants:     None     Assessment/Plan   Active Problems / Assessment:   Anterior left thigh wound     Plan:   Received tetanus prior to transfer to [facility]  Monitor q6 hgb, if stable will change frequency  Pain medications PRN  Compression dressing over wound  If remains stable and able to ambulate, possible discharge tomorrow      History of Present Illness     Chief Complaint: right thigh pain  Mechanism:Fall     HPI:    Russ Diaz is a 44 y.o. male who initially presented at [facility] after falling in his lawn landing on an object stuck in the ground. He was cleaned and sutured at [facility] and CTA was performed revealing no evidence of acute arterial injury. No contrast extravasation. + intramuscular hematoma of rectus femoris. He was transferred to [facility] as a a trauma level A. On examination, the right thigh wound was sutured with underlying hematoma. No skin changes. AROM of all extremities, palpable DP/PT bilaterally. Pain controlled. No additional injuries noted.     Review of Systems   All other systems reviewed and are negative.    12-point, complete review of systems was reviewed and negative except as stated above.     Historical Information     No past medical history on file.  No past surgical history on file.       Social History     Tobacco Use    Smoking status: Never    Smokeless tobacco: Never     Immunization History   Administered Date(s) Administered    Tdap 05/04/2024     Last Tetanus: Given prior to arriavl  Family History: Non-contributory     Meds/Allergies   PTA meds:   Prior to Admission Medications   Prescriptions Last Dose Informant Patient Reported? Taking?   clindamycin (CLEOCIN) 150 mg capsule   No No   Sig: Take 3 capsules (450 mg total) by mouth  every 6 (six) hours for 10 days   oxyCODONE-acetaminophen (PERCOCET) 5-325 mg per tablet   No No   Sig: Take 1 tablet by mouth every 4 (four) hours as needed for severe pain for up to 5 days Max Daily Amount: 6 tablets      Facility-Administered Medications: None     Allergies have not been reviewed;    Allergies   Allergen Reactions    Penicillins Anaphylaxis       Objective   Initial Vitals:   Pulse: 66 (05/04/24 2233)  Respirations: 18 (05/04/24 2233)  Blood Pressure: 144/77 (05/04/24 2233)    Primary Survey:   Airway:        Status: patent;        Pre-hospital Interventions: none          Breathing:        Pre-hospital Interventions: none              Right breath sounds: normal       Left breath sounds: normal  Circulation:        Rhythm:           Right Pulses Left Pulses    R radial: 2+  R femoral: 2+  R pedal: 2+  R carotid: 2+   L radial: 2+  L femoral: 2+  L pedal: 2+  L carotid: 2+     Disability:        GCS: Eye: 4; Verbal: 5 Motor: 6 Total: 15       Right Pupil: 3 mm;  round;  reactive         Left Pupil:  3 mm;  round;  reactive      R Motor Strength L Motor Strength    R : 5/5  R dorsiflex: 5/5  R plantarflex: 5/5 L : 5/5  L dorsiflex: 5/5  L plantarflex: 5/5        Sensory:  No sensory deficit  Exposure:       Completed: Yes      Secondary Survey:  Physical Exam  Constitutional:       Appearance: He is normal weight. He is not ill-appearing, toxic-appearing or diaphoretic.   HENT:      Head: Normocephalic and atraumatic.      Right Ear: Tympanic membrane and external ear normal.      Left Ear: Tympanic membrane and external ear normal.      Nose: Nose normal. No congestion.      Mouth/Throat:      Mouth: Mucous membranes are moist.      Pharynx: Oropharynx is clear. No oropharyngeal exudate.   Eyes:      General: No scleral icterus.        Right eye: No discharge.         Left eye: No discharge.      Extraocular Movements: Extraocular movements intact.      Conjunctiva/sclera: Conjunctivae  normal.      Pupils: Pupils are equal, round, and reactive to light.   Cardiovascular:      Rate and Rhythm: Normal rate and regular rhythm.      Pulses: Normal pulses.      Heart sounds: Normal heart sounds.   Pulmonary:      Effort: Pulmonary effort is normal. No respiratory distress.      Breath sounds: Normal breath sounds. No stridor. No wheezing or rhonchi.   Chest:      Chest wall: No tenderness.   Abdominal:      General: Abdomen is flat. Bowel sounds are normal. There is no distension.      Tenderness: There is no abdominal tenderness. There is no right CVA tenderness, left CVA tenderness, guarding or rebound.   Genitourinary:     Rectum: Normal.   Musculoskeletal:         General: Normal range of motion.      Cervical back: Normal range of motion and neck supple. No tenderness.      Right lower leg: No edema.      Left lower leg: No edema.      Comments: Anterior right thigh wound sutured prior to arrival, underlying hematoma, no skin threat noted   Skin:     General: Skin is warm and dry.      Capillary Refill: Capillary refill takes less than 2 seconds.      Comments: Wound as above   Neurological:      General: No focal deficit present.      Mental Status: He is alert. Mental status is at baseline.   Psychiatric:         Mood and Affect: Mood normal.         Behavior: Behavior normal.         Invasive Devices       Peripheral Intravenous Line  Duration             Peripheral IV 05/04/24 Right Antecubital <1 day                  Lab Results: I have personally reviewed all pertinent laboratory/test results 05/04/24 and in the preceding 24 hours.  Recent Labs     05/04/24  1734 05/04/24  2246   WBC 5.91 10.06   HGB 14.0 14.7   HCT 43.3 46.0    204   SODIUM 140 136   K 3.9 3.7    103   CO2 29 24   BUN 15 12   CREATININE 0.98 0.92   GLUC 72 93   AST 17 15   ALT 11 10   ALB 4.2 4.4   TBILI 0.77 0.74   ALKPHOS 74 74   PTT 29  --    INR 1.02  --        Imaging Results: I have personally reviewed  pertinent images saved in PACS. CT scan findings (and other pertinent positive findings on images) were discussed with radiology. My interpretation of the images/reports are as follows:  Chest Xray(s): negative for acute findings   FAST exam(s): negative for acute findings   CT Scan(s): No acute arterial injury, no contrast extravasation. Intramuscular hematoma   Additional Xray(s): N/A       Code Status: Level 1 - Full Code  Advance Directive and Living Will:      Power of :    POLST:    I have spent 35 minutes with Patient  today in which greater than 50% of this time was spent in counseling/coordination of care regarding Diagnostic results, Instructions for management, Importance of tx compliance, Impressions, Reviewing / ordering tests, medicine, procedures  , and Obtaining or reviewing history  .

## 2024-05-06 LAB
BASE EXCESS BLDA CALC-SCNC: 0 MMOL/L (ref -2–3)
CA-I BLD-SCNC: 0.88 MMOL/L (ref 1.12–1.32)
GLUCOSE SERPL-MCNC: 93 MG/DL (ref 65–140)
HCO3 BLDA-SCNC: 23.1 MMOL/L (ref 24–30)
HCT VFR BLD CALC: 45 % (ref 36.5–49.3)
HGB BLDA-MCNC: 15.3 G/DL (ref 12–17)
PCO2 BLD: 24 MMOL/L (ref 21–32)
PCO2 BLD: 33.6 MM HG (ref 42–50)
PH BLD: 7.45 [PH] (ref 7.3–7.4)
PO2 BLD: 22 MM HG (ref 35–45)
POTASSIUM BLD-SCNC: 3.6 MMOL/L (ref 3.5–5.3)
SAO2 % BLD FROM PO2: 40 % (ref 60–85)
SODIUM BLD-SCNC: 137 MMOL/L (ref 136–145)
SPECIMEN SOURCE: ABNORMAL

## 2024-05-26 ENCOUNTER — HOSPITAL ENCOUNTER (EMERGENCY)
Facility: HOSPITAL | Age: 44
Discharge: LEFT BEFORE DISCHARGE | End: 2024-05-26
Attending: EMERGENCY MEDICINE

## 2024-05-26 VITALS
DIASTOLIC BLOOD PRESSURE: 61 MMHG | RESPIRATION RATE: 16 BRPM | TEMPERATURE: 97.9 F | HEART RATE: 82 BPM | SYSTOLIC BLOOD PRESSURE: 110 MMHG | OXYGEN SATURATION: 97 %

## 2024-05-26 DIAGNOSIS — Z48.02 ENCOUNTER FOR REMOVAL OF SUTURES: Primary | ICD-10-CM

## 2024-05-26 DIAGNOSIS — M79.89 SWELLING OF RIGHT LOWER EXTREMITY: ICD-10-CM

## 2024-05-26 PROCEDURE — 99283 EMERGENCY DEPT VISIT LOW MDM: CPT

## 2024-05-26 PROCEDURE — 99284 EMERGENCY DEPT VISIT MOD MDM: CPT | Performed by: EMERGENCY MEDICINE

## 2024-05-26 RX ORDER — LIDOCAINE HCL/EPINEPHRINE/PF 2%-1:200K
1 VIAL (ML) INJECTION ONCE
Status: COMPLETED | OUTPATIENT
Start: 2024-05-26 | End: 2024-05-26

## 2024-05-26 RX ORDER — GINSENG 100 MG
1 CAPSULE ORAL ONCE
Status: COMPLETED | OUTPATIENT
Start: 2024-05-26 | End: 2024-05-26

## 2024-05-26 RX ADMIN — BACITRACIN 1 SMALL APPLICATION: 500 OINTMENT TOPICAL at 19:14

## 2024-05-26 RX ADMIN — LIDOCAINE HYDROCHLORIDE,EPINEPHRINE BITARTRATE 1 ML: 20; .005 INJECTION, SOLUTION EPIDURAL; INFILTRATION; INTRACAUDAL; PERINEURAL at 18:29

## 2024-05-26 NOTE — ED PROVIDER NOTES
Pt Name: Russ Diaz  MRN: [id number]  Birthdate 1980  Age/Sex: 44 y.o. male  Date of evaluation: 5/26/2024  PCP: No primary care provider on file.    CHIEF COMPLAINT    Chief Complaint   Patient presents with    Suture / Staple Removal     C/o right thigh suture removal. Pt also c/o right lower leg swelling.          HPI    44 y.o. male presenting with request for suture removal as well as evaluation of right lower leg swelling.  Patient sustained a wound to his right upper leg in the anterior thigh on the fourth of this month, he states from a fall.  He states that the wound was sutured and he was transferred to a trauma center, states they prescribed an antibiotic but he never picked it up.  He states that he began having swelling to the leg below the wound and throughout the entire leg about 3 days after the wound, staying the same or potentially worsening since then.  He denies chest pain, shortness of breath, fever, trauma, numbness, weakness, other symptoms.  On review of the EMR, patient noted to have significant hematoma to that leg and some concern for vascular injury from penetrating trauma.  HPI      Past Medical and Surgical History    History reviewed. No pertinent past medical history.    History reviewed. No pertinent surgical history.    History reviewed. No pertinent family history.    Social History     Tobacco Use    Smoking status: Never    Smokeless tobacco: Never   Substance Use Topics    Alcohol use: Yes     Comment: only occasionally    Drug use: Yes     Types: Marijuana     Comment: couple times a week           Allergies    Allergies   Allergen Reactions    Penicillins Anaphylaxis       Home Medications    Prior to Admission medications    Medication Sig Start Date End Date Taking? Authorizing Provider   acetaminophen (TYLENOL) 325 mg tablet Take 3 tablets (975 mg total) by mouth every 6 (six) hours 5/5/24   David Reyes PA-C   clotrimazole (LOTRIMIN) 1 % cream Apply  topically 2 (two) times a day for 7 days 5/5/24 5/12/24  David Reyes PA-C           Review of Systems    Review of Systems   Constitutional:  Negative for appetite change, chills and diaphoresis.   HENT:  Negative for drooling, facial swelling, trouble swallowing and voice change.    Respiratory:  Negative for apnea, shortness of breath and wheezing.    Cardiovascular:  Positive for leg swelling. Negative for chest pain.   Gastrointestinal:  Negative for abdominal distention, abdominal pain, diarrhea, nausea and vomiting.   Genitourinary:  Negative for dysuria and urgency.   Musculoskeletal:  Negative for arthralgias, back pain, gait problem and neck pain.   Skin:  Negative for color change, rash and wound.   Neurological:  Negative for seizures, speech difficulty, weakness and headaches.   Psychiatric/Behavioral:  Negative for agitation, behavioral problems and dysphoric mood. The patient is not nervous/anxious.            All other systems reviewed and negative.    Physical Exam      ED Triage Vitals [05/26/24 1810]   Temperature Pulse Respirations Blood Pressure SpO2   97.9 °F (36.6 °C) 82 16 110/61 97 %      Temp Source Heart Rate Source Patient Position - Orthostatic VS BP Location FiO2 (%)   Oral Monitor Sitting Left arm --      Pain Score       --               Physical Exam  Vitals and nursing note reviewed.   Constitutional:       General: He is not in acute distress.     Appearance: He is well-developed. He is not ill-appearing, toxic-appearing or diaphoretic.   HENT:      Head: Normocephalic and atraumatic.      Right Ear: External ear normal.      Left Ear: External ear normal.      Nose: Nose normal. No congestion or rhinorrhea.      Mouth/Throat:      Mouth: Mucous membranes are moist.      Pharynx: Oropharynx is clear. No oropharyngeal exudate or posterior oropharyngeal erythema.   Eyes:      Conjunctiva/sclera: Conjunctivae normal.      Pupils: Pupils are equal, round, and reactive to light.    Neck:      Trachea: No tracheal deviation.   Cardiovascular:      Rate and Rhythm: Normal rate and regular rhythm.      Pulses: Normal pulses.   Pulmonary:      Effort: Pulmonary effort is normal. No respiratory distress.      Breath sounds: Normal breath sounds. No stridor. No wheezing or rales.   Abdominal:      General: There is no distension.   Musculoskeletal:         General: No deformity. Normal range of motion.      Cervical back: Normal range of motion and neck supple.      Right lower leg: Edema present.      Comments: Wound with overgrown sutures noted to the right upper thigh, no erythema, no purulence, no fluctuance.  1+ pitting edema of the right lower extremity, most prominent about the ankle, no bruising, strength sensation pulse and cap refill intact throughout that extremity.   Skin:     General: Skin is warm and dry.      Capillary Refill: Capillary refill takes less than 2 seconds.      Findings: No rash.   Neurological:      Mental Status: He is alert and oriented to person, place, and time.   Psychiatric:         Behavior: Behavior normal.         Thought Content: Thought content normal.         Judgment: Judgment normal.              Diagnostic Results      Labs:    Results Reviewed       None            All labs reviewed and utilized in the medical decision making process    Radiology:    VAS VENOUS DUPLEX -LOWER LIMB UNILATERAL    (Results Pending)       All radiology studies independently viewed by me and interpreted by the radiologist.    Procedure    Suture removal    Date/Time: 5/26/2024 6:30 PM    Performed by: Andrew Arevalo MD  Authorized by: Andrew Arevalo MD  Universal Protocol:  Consent: Verbal consent obtained.  Risks and benefits: risks, benefits and alternatives were discussed  Consent given by: patient  Required items: required blood products, implants, devices, and special equipment available  Patient identity confirmed: provided demographic data      Patient  location:  ED  Location:     Laterality:  Right    Location:  Lower extremity    Lower extremity location:  Leg    Leg location:  R upper leg  Procedure details:     Tools used:  Suture removal kit    Wound appearance:  No sign(s) of infection, nonpurulent and clean    Number of sutures removed:  5  Post-procedure details:     Post-removal:  Antibiotic ointment applied and dressing applied    Patient tolerance of procedure:  Tolerated well, no immediate complications          ED Course of Care and Re-Assessments      After initial attempt at removal of sutures, clipping 1 suture and started to tug at it, patient requested that he be numbed up before we proceeded.  Anesthetized with lidocaine with epinephrine with skin prepped with alcohol before hand, sutures removed without incident afterwards.  Wound dressed with bacitracin and bandage.    I discussed my after review of EMR with concern for some vascular injury as well as continued swelling 22 days after initial injury felt to be somewhat atypical, I discussed my concerns for possible DVT with patient, ordered vascular duplex to rule out same.  Patient amenable to that plan.    Approximately 30 minutes after the order was placed, patient's  requested an update, I discussed current ultrasound coverage as well as possibility that scan may be delayed somewhat while waiting for tech to perform the scan.  She indicated understanding at that time.    Sometime later, I was informed by nursing that the patient and his  have left.  I immediately went to the room to try to discuss further with the patient but unfortunately he had already left by that time and was no longer in the ER    Medications   lidocaine-epinephrine (XYLOCAINE-MPF/EPINEPHRINE) 2 %-1:200,000 injection 1 mL (1 mL Infiltration Given 5/26/24 1829)   bacitracin topical ointment 1 small application (1 small application Topical Given 5/26/24 1914)           FINAL IMPRESSION    Final  "diagnoses:   Encounter for removal of sutures   Swelling of right lower extremity         DISPOSITION/PLAN    Presentation as above with request for removal of sutures as well as evaluation of swelling of the right lower extremity.  Sutures removed as above without incident or dehiscence, wound dressed.  Vital signs reassuring, examination remarkable for lower extremity swelling.  Unclear if the swelling is secondary to sequela of previous wound and hematoma as noted in the EMR, or if this represents another process to include potentially a DVT.  Unfortunately, while awaiting vascular ultrasound, patient left the department.  Time reflects when diagnosis was documented in both MDM as applicable and the Disposition within this note       Time User Action Codes Description Comment    5/26/2024  9:51 PM Andrew Arevalo Add [Z48.02] Encounter for removal of sutures     5/26/2024  9:51 PM Andrew Arevalo Add [M79.89] Swelling of right lower extremity           ED Disposition       ED Disposition   Left from Room after Provider Exam    Condition   --    Date/Time   Sun May 26, 2024  8:26 PM    Comment   --             Follow-up Information    None           PATIENT REFERRED TO:    No follow-up provider specified.    DISCHARGE MEDICATIONS:    Discharge Medication List as of 5/26/2024  8:26 PM        CONTINUE these medications which have NOT CHANGED    Details   acetaminophen (TYLENOL) 325 mg tablet Take 3 tablets (975 mg total) by mouth every 6 (six) hours, Starting Sun 5/5/2024, No Print      clotrimazole (LOTRIMIN) 1 % cream Apply topically 2 (two) times a day for 7 days, Starting Sun 5/5/2024, Until Sun 5/12/2024, Normal             No discharge procedures on file.         Andrew Arevalo MD    Portions of the record may have been created with voice recognition software.  Occasional wrong word or \"sound alike\" substitutions may have occurred due to the inherent limitations of voice recognition software.  " Please read the chart carefully and recognize, using context, where substitutions have occurred     Andrew Arevalo MD  05/26/24 6519

## 2024-05-26 NOTE — ED NOTES
Report received from previous shift. Pt is resting at this time.     Lori Cervantes, BAO  05/26/24 4026

## 2024-05-27 NOTE — ED NOTES
"After receiving multiple complaint's from staff about patient's female visitor wandering the halls, looking in patient rooms, stopping every staff member she saw, and standing in the doorway of the room staring angrily at every staff member she saw for the last hour and a half, as well as witnessing her yell at staff members and degrade them despite multiple nurses and providers explaining the plan of care to her, this writer approached pt's visitor, who had been standing in the doorway, in attempt to better understand the situation and attempt to re-explain the plan of care with her as well as the pt - that the staff in the ED has no control over how quickly ultrasound will come to see the patient. Pt's visitor immediately cut this writer off and stated \"you have no idea who I am, I am your field and I am going to renetta you all.\" The patient then became angry as well and states \"fuck this shit we're leaving\". This writer tried again to deescalate the situation but was unsuccessful as the female visitor kept stating \"I am one of you and you will see, I'm going to renetta\". This writer then called security. Provider was notified and pt and his visitor walked out of the department.      Arianna Kingsley RN  05/26/24 2025    "

## 2024-05-27 NOTE — ED NOTES
"Pts family member was walking in fast track asking all staff when the pt was going to be taken to ultrasound. This nurse witnessed the family asking Arianna from CT scan. This nurse approached and explained that ultrasound would be over to get the pt when they were available and to please wait near the room. The female than stated that this nurse should mind her business because \"this hospitals does not understand HIPAA\". This nurse attempted to calm the family member and the female responded \"you better watch how you talk to me, you do not know who you are talking to\".     Lori Cervantes, RN  05/26/24 2007    "

## 2024-05-27 NOTE — ED NOTES
Pt and his family member walked out of the emergency department. Dr Arevalo made aware.     Lori Cervantes RN  05/26/24 2008

## 2024-05-27 NOTE — ED NOTES
Pt family member keeps coming out into the berry looking for someone to take the pt to ultrasound. This nurse explained that ultrasound would be over when they were ready for the pt. The female family member states this is not satisfactory to her. This nurse attempted to reassure the family that everything would be done as fast as possible. Female family member mumbles under her breath and refuses to speak with this nurse at this time.     Lori Cervantes RN  05/26/24 2010

## 2024-06-04 PROBLEM — S81.811A LEG LACERATION, RIGHT, INITIAL ENCOUNTER: Status: RESOLVED | Noted: 2024-05-05 | Resolved: 2024-06-04
